# Patient Record
Sex: FEMALE
[De-identification: names, ages, dates, MRNs, and addresses within clinical notes are randomized per-mention and may not be internally consistent; named-entity substitution may affect disease eponyms.]

---

## 2018-05-02 ENCOUNTER — HOSPITAL ENCOUNTER (EMERGENCY)
Dept: HOSPITAL 25 - UCEAST | Age: 38
Discharge: HOME | End: 2018-05-02
Payer: MEDICAID

## 2018-05-02 VITALS — SYSTOLIC BLOOD PRESSURE: 145 MMHG | DIASTOLIC BLOOD PRESSURE: 99 MMHG

## 2018-05-02 DIAGNOSIS — K04.7: Primary | ICD-10-CM

## 2018-05-02 DIAGNOSIS — Z88.8: ICD-10-CM

## 2018-05-02 DIAGNOSIS — Z88.5: ICD-10-CM

## 2018-05-02 PROCEDURE — 99202 OFFICE O/P NEW SF 15 MIN: CPT

## 2018-05-02 PROCEDURE — G0463 HOSPITAL OUTPT CLINIC VISIT: HCPCS

## 2018-05-02 NOTE — UC
Dental HPI





- HPI Summary


HPI Summary: 





Pt presents with front upper tooth pain and swelling for the last 3 days. Today 

the swelling seemed to be the worst - extending to the left upper cheek. She 

does not have a dentist as she just moved to [place], but is looking for one 

that takes her insurance. Is able to eat and drink, but does have some pain. 

Denies fever, chills, drainage.





- History of Current Complaint


Chief Complaint: UCDentalProblem


Stated Complaint: DENTAL COMPLAINT


Time Seen by Provider: 05/02/18 16:46


Hx Obtained From: Patient


Hx Last Menstrual Period: one week ago


Onset/Duration: Gradual Onset


Severity: Moderate


Pain Intensity: 6


Pain Scale Used: 0-10 Numeric





- Allergies/Home Medications


Allergies/Adverse Reactions: 


 Allergies











Allergy/AdvReac Type Severity Reaction Status Date / Time


 


codeine Allergy  Rash And Verified 05/02/18 16:44





   Itching  


 


paroxetine [From Paxil] Allergy  Altered Verified 05/02/18 16:44





   Mental  





   Status  














PMH/Surg Hx/FS Hx/Imm Hx





- Additional Past Medical History


Additional PMH: 





None


Previously Healthy: Yes





- Surgical History


Surgical History: None





- Family History


Known Family History: Positive: None





- Social History


Lives: With Family


Alcohol Use: None


Substance Use Type: None


Smoking Status (MU): Never Smoked Tobacco





- Immunization History


Most Recent Influenza Vaccination: 10/13





Review of Systems


Constitutional: Negative


Skin: Negative


ENT: Dental Pain


Respiratory: Negative


Cardiovascular: Negative


Gastrointestinal: Negative


Neurovascular: Negative


Neurological: Negative


Psychological: Negative


All Other Systems Reviewed And Are Negative: Yes





Physical Exam





- Summary


Physical Exam Summary: 





GENERAL: NAD. WDWN. No pain distress.


SKIN: No rashes, sores, lesions, or open wounds.


HEENT:


            Head: AT/NC


            Eyes:  EOM intact. 


            Nose: Nasal mucosa pink and moist. Mild TTP overlying the left 

maxillary sinus


            Throat: Posterior oropharynx without exudates, erythema, or 

tonsillar enlargement.  Uvula midline.


NECK: Supple. Nontender. No lymphadenopathy. 


CHEST:  No accessory muscle use. Breathing comfortably and in no distress.


CV:  RRR. Without m/r/g. 


NEURO: Alert. CN II-XII grossly intact.


PSYCH: Age appropriate behavior.


Triage Information Reviewed: Yes


Vital Signs: 


 Initial Vital Signs











Temp  97.5 F   05/02/18 16:41


 


Pulse  78   05/02/18 16:41


 


Resp  18   05/02/18 16:41


 


BP  145/99   05/02/18 16:41


 


Pulse Ox  100   05/02/18 16:41











Dental: Positive: Percussion Tenderness @ - Tooth 8 and 7, Gross Decay/Caries @ 

- Throughout, Dental Fracture @ - Multiple throughout, Abscess @ - Tooth 8 and 

7.  Negative: Cellulitis @, Cervical Lymphadenopathy, Bleeding





Dental Complaint Course/Dx





- Course


Course Of Treatment: Dental abscess Tooth 8 and 7 - Augmentin





- Differential Dx/Diagnosis


Provider Diagnoses: Dental abscess Tooth 8 and 7





Discharge





- Sign-Out/Discharge


Documenting (check all that apply): Discharge/Admit/Transfer





- Discharge Plan


Condition: Stable


Disposition: HOME


Prescriptions: 


Amoxicillin/Clavulanate TAB* [Augmentin *] 875 mg PO BID #20 tab


Patient Education Materials:  Dental Abscess (ED)


Referrals: 


No Primary Care Phys,NOPCP [Primary Care Provider] - 


Additional Instructions: 


If you develop a fever, shortness of breath, chest pain, new or worsening 

symptoms - please call your PCP or go to the ED.


 





Your blood pressure was high at todays visit. Please see your primary provider 

within 4 weeks for recheck and re-evaluation.





1) Please schedule a follow up with your dentist as soon as possible for 

further evaluation





- Billing Disposition and Condition


Condition: STABLE


Disposition: HOME